# Patient Record
Sex: MALE | Employment: OTHER
[De-identification: names, ages, dates, MRNs, and addresses within clinical notes are randomized per-mention and may not be internally consistent; named-entity substitution may affect disease eponyms.]

---

## 2020-09-01 ENCOUNTER — OFFICE VISIT (OUTPATIENT)
Age: 65
End: 2020-09-01

## 2021-04-01 ENCOUNTER — OFFICE VISIT (OUTPATIENT)
Age: 66
End: 2021-04-01

## 2021-04-29 ENCOUNTER — OFFICE VISIT (OUTPATIENT)
Dept: URBAN - METROPOLITAN AREA CLINIC 9 | Facility: CLINIC | Age: 66
End: 2021-04-29

## 2021-05-10 ENCOUNTER — OFFICE VISIT (OUTPATIENT)
Dept: URBAN - METROPOLITAN AREA SURGERY CENTER 9 | Facility: SURGERY CENTER | Age: 66
End: 2021-05-10

## 2021-05-24 ENCOUNTER — OFFICE VISIT (OUTPATIENT)
Dept: URBAN - METROPOLITAN AREA SURGERY CENTER 9 | Facility: SURGERY CENTER | Age: 66
End: 2021-05-24

## 2021-06-09 ENCOUNTER — TELEPHONE ENCOUNTER (OUTPATIENT)
Dept: URBAN - METROPOLITAN AREA CLINIC 9 | Facility: CLINIC | Age: 66
End: 2021-06-09

## 2021-06-09 ENCOUNTER — OFFICE VISIT (OUTPATIENT)
Dept: URBAN - METROPOLITAN AREA CLINIC 9 | Facility: CLINIC | Age: 66
End: 2021-06-09

## 2021-11-01 ENCOUNTER — OFFICE VISIT (OUTPATIENT)
Age: 66
End: 2021-11-01

## 2021-11-03 ENCOUNTER — TELEPHONE ENCOUNTER (OUTPATIENT)
Dept: URBAN - METROPOLITAN AREA CLINIC 9 | Facility: CLINIC | Age: 66
End: 2021-11-03

## 2021-12-08 ENCOUNTER — OFFICE VISIT (OUTPATIENT)
Dept: URBAN - METROPOLITAN AREA CLINIC 9 | Facility: CLINIC | Age: 66
End: 2021-12-08

## 2022-07-30 ENCOUNTER — TELEPHONE ENCOUNTER (OUTPATIENT)
Age: 67
End: 2022-07-30

## 2022-07-31 ENCOUNTER — TELEPHONE ENCOUNTER (OUTPATIENT)
Age: 67
End: 2022-07-31

## 2022-07-31 RX ORDER — FAMOTIDINE 20 MG/1
1 TABLET ORAL
Qty: 0 | Refills: 16 | Status: ACTIVE | COMMUNITY
Start: 2021-04-29

## 2022-07-31 RX ORDER — FAMOTIDINE 20 MG/1
1 TABLET ORAL
Qty: 0 | Refills: 16 | Status: ACTIVE | COMMUNITY
Start: 2021-06-09

## 2022-07-31 RX ORDER — FAMOTIDINE 20 MG/1
1 TABLET ORAL
Qty: 0 | Refills: 16 | Status: ACTIVE | COMMUNITY
Start: 2021-12-08

## 2022-12-27 ENCOUNTER — DASHBOARD ENCOUNTERS (OUTPATIENT)
Age: 67
End: 2022-12-27

## 2022-12-27 ENCOUNTER — WEB ENCOUNTER (OUTPATIENT)
Dept: URBAN - METROPOLITAN AREA CLINIC 9 | Facility: CLINIC | Age: 67
End: 2022-12-27

## 2022-12-27 ENCOUNTER — OFFICE VISIT (OUTPATIENT)
Dept: URBAN - METROPOLITAN AREA CLINIC 9 | Facility: CLINIC | Age: 67
End: 2022-12-27
Payer: MEDICARE

## 2022-12-27 VITALS
WEIGHT: 178 LBS | BODY MASS INDEX: 25.48 KG/M2 | SYSTOLIC BLOOD PRESSURE: 138 MMHG | DIASTOLIC BLOOD PRESSURE: 80 MMHG | HEIGHT: 70 IN

## 2022-12-27 DIAGNOSIS — K82.4 GALLBLADDER POLYP: ICD-10-CM

## 2022-12-27 DIAGNOSIS — Z83.71 SCREENING COLONOSCOPY (Z12.11) FAMILY HISTORY OF COLONIC POLYPS: ICD-10-CM

## 2022-12-27 DIAGNOSIS — K21.9 GASTROESOPHAGEAL REFLUX DISEASE, UNSPECIFIED WHETHER ESOPHAGITIS PRESENT: ICD-10-CM

## 2022-12-27 PROCEDURE — 99213 OFFICE O/P EST LOW 20 MIN: CPT | Performed by: INTERNAL MEDICINE

## 2022-12-27 RX ORDER — FAMOTIDINE 20 MG/1
1 TABLET ORAL
OUTPATIENT
Start: 2021-12-08

## 2022-12-27 RX ORDER — FAMOTIDINE 20 MG/1
1 TABLET ORAL
Qty: 0 | Refills: 16 | Status: ACTIVE | COMMUNITY
Start: 2021-12-08

## 2022-12-27 NOTE — HPI-TODAY'S VISIT:
Pt here for f/u of dyspepsia. . No medications no nsaids No OTCs or herbals . Hx/o GERD with atypical chest pain presumed shoulder related . 2021 colon without polyps, repeat 5 years 2021 EGD with HP neg gastritis, duodenal bx negative . 2021 normal lipase, CMp, CBC 6/2021 US with 4 mm GB polyp 11/2021 US with no GB polyp, repeat 1 year . Pt here for f/u and doing well. he needs a f/u US for GB polyp and if no polyp is seen will stop Gb surveillance. Will cont famotidine once daily which has controlled his gerd. He will rtc 1 year, sooner if any symptoms. .

## 2024-08-30 ENCOUNTER — OFFICE VISIT (OUTPATIENT)
Dept: URBAN - METROPOLITAN AREA CLINIC 9 | Facility: CLINIC | Age: 69
End: 2024-08-30
Payer: MEDICARE

## 2024-08-30 VITALS
BODY MASS INDEX: 24.62 KG/M2 | DIASTOLIC BLOOD PRESSURE: 96 MMHG | HEIGHT: 70 IN | SYSTOLIC BLOOD PRESSURE: 162 MMHG | WEIGHT: 172 LBS

## 2024-08-30 DIAGNOSIS — R10.13 DYSPEPSIA: ICD-10-CM

## 2024-08-30 DIAGNOSIS — R17 ELEVATED BILIRUBIN: ICD-10-CM

## 2024-08-30 DIAGNOSIS — R11.0 NAUSEA: ICD-10-CM

## 2024-08-30 DIAGNOSIS — K82.4 GALLBLADDER POLYP: ICD-10-CM

## 2024-08-30 PROCEDURE — 99214 OFFICE O/P EST MOD 30 MIN: CPT | Performed by: INTERNAL MEDICINE

## 2024-08-30 RX ORDER — FAMOTIDINE 20 MG/1
1 TABLET ORAL
Status: ACTIVE | COMMUNITY
Start: 2021-12-08

## 2024-08-30 RX ORDER — FAMOTIDINE 20 MG/1
1 TABLET ORAL
OUTPATIENT
Start: 2021-12-08

## 2024-08-30 NOTE — HPI-TODAY'S VISIT:
Pt here for f/u of dyspepsia. . No medications no nsaids No OTCs or herbals . Hx/o GERD with atypical chest pain presumed shoulder related . 2021 colon without polyps, repeat 5 years 2021 EGD with HP neg gastritis, duodenal bx negative . 2021 normal lipase, CMp, CBC 6/2021 US with 4 mm GB polyp 11/2021 US with no GB polyp, repeat 1 year 12/2023 US with stable GB polyps 7/2024 CT a/p negative . 2/2024 CBC, BMP, lfts reviewed. TB elevated to 2.2. . Pt here for f/u. He is doing well. He still has GB polyps and needs a 1 year f/u US in 12/2024. He is on pepcid bid  and still having some persistent mild nausea. He has elevated lfts in 2/2024 and etiology is unclear so will repeat lfts, fractionate TB and if still elevated plan MRCP due to ongoing nausea. Also needs EGD with hp biopsies and celiac bx .

## 2024-09-09 ENCOUNTER — CLAIMS CREATED FROM THE CLAIM WINDOW (OUTPATIENT)
Dept: URBAN - METROPOLITAN AREA SURGERY CENTER 9 | Facility: SURGERY CENTER | Age: 69
End: 2024-09-09
Payer: MEDICARE

## 2024-09-09 ENCOUNTER — CLAIMS CREATED FROM THE CLAIM WINDOW (OUTPATIENT)
Dept: URBAN - METROPOLITAN AREA CLINIC 4 | Facility: CLINIC | Age: 69
End: 2024-09-09
Payer: MEDICARE

## 2024-09-09 DIAGNOSIS — K31.7 POLYP OF STOMACH AND DUODENUM: ICD-10-CM

## 2024-09-09 DIAGNOSIS — K29.70 GASTRITIS WITHOUT BLEEDING, UNSPECIFIED CHRONICITY, UNSPECIFIED GASTRITIS TYPE: ICD-10-CM

## 2024-09-09 DIAGNOSIS — K31.89 OTHER DISEASES OF STOMACH AND DUODENUM: ICD-10-CM

## 2024-09-09 PROCEDURE — 88305 TISSUE EXAM BY PATHOLOGIST: CPT | Performed by: PATHOLOGY

## 2024-09-09 PROCEDURE — 43239 EGD BIOPSY SINGLE/MULTIPLE: CPT | Performed by: INTERNAL MEDICINE

## 2024-09-09 PROCEDURE — 43239 EGD BIOPSY SINGLE/MULTIPLE: CPT | Performed by: CLINIC/CENTER

## 2024-09-09 PROCEDURE — 00731 ANES UPR GI NDSC PX NOS: CPT | Performed by: NURSE ANESTHETIST, CERTIFIED REGISTERED

## 2024-09-09 RX ORDER — FAMOTIDINE 20 MG/1
1 TABLET ORAL
Status: ACTIVE | COMMUNITY
Start: 2021-12-08

## 2024-11-06 ENCOUNTER — TELEPHONE ENCOUNTER (OUTPATIENT)
Dept: URBAN - METROPOLITAN AREA CLINIC 9 | Facility: CLINIC | Age: 69
End: 2024-11-06

## 2024-11-19 ENCOUNTER — OFFICE VISIT (OUTPATIENT)
Dept: URBAN - METROPOLITAN AREA CLINIC 9 | Facility: CLINIC | Age: 69
End: 2024-11-19
Payer: MEDICARE

## 2024-11-19 VITALS
BODY MASS INDEX: 25.2 KG/M2 | WEIGHT: 176 LBS | SYSTOLIC BLOOD PRESSURE: 168 MMHG | HEIGHT: 70 IN | DIASTOLIC BLOOD PRESSURE: 98 MMHG

## 2024-11-19 DIAGNOSIS — R10.13 DYSPEPSIA: ICD-10-CM

## 2024-11-19 DIAGNOSIS — K82.4 GALLBLADDER POLYP: ICD-10-CM

## 2024-11-19 PROCEDURE — 99213 OFFICE O/P EST LOW 20 MIN: CPT | Performed by: INTERNAL MEDICINE

## 2024-11-19 RX ORDER — FAMOTIDINE 20 MG/1
1 TABLET ORAL
Status: ACTIVE | COMMUNITY
Start: 2021-12-08

## 2024-11-19 RX ORDER — FAMOTIDINE 20 MG/1
1 TABLET ORAL
OUTPATIENT
Start: 2021-12-08

## 2024-11-19 NOTE — HPI-TODAY'S VISIT:
Pt here for f/u of dyspepsia. . No medications no nsaids No OTCs or herbals . Hx/o GERD with atypical chest pain presumed shoulder related . 2021 colon without polyps, repeat 5 years 2021 EGD with HP neg gastritis, duodenal bx negative 9/2024 EGD with bx neg for HP and celiac . 2021 normal lipase, CMp, CBC 6/2021 US with 4 mm GB polyp 11/2021 US with no GB polyp, repeat 1 year 12/2023 US with stable GB polyps 7/2024 CT a/p negative . 2/2024 CBC, BMP, lfts reviewed. TB elevated to 2.2. 2024 repeat lfts with fractionation with TB of 1.3 and indirect of 1.3 consistent with gilbert's . he has a f/u US in 12/2024. Overall he is better on pepcid bid and is doing well without any worsening symptoms. I will hold on further dyspepsia sx, await US from next month and rtc in 1 year. .

## 2024-12-02 ENCOUNTER — LAB OUTSIDE AN ENCOUNTER (OUTPATIENT)
Dept: URBAN - METROPOLITAN AREA CLINIC 9 | Facility: CLINIC | Age: 69
End: 2024-12-02

## 2024-12-09 ENCOUNTER — LAB OUTSIDE AN ENCOUNTER (OUTPATIENT)
Dept: URBAN - METROPOLITAN AREA CLINIC 9 | Facility: CLINIC | Age: 69
End: 2024-12-09

## 2025-05-15 ENCOUNTER — OFFICE VISIT (OUTPATIENT)
Dept: URBAN - METROPOLITAN AREA CLINIC 9 | Facility: CLINIC | Age: 70
End: 2025-05-15
Payer: MEDICARE

## 2025-05-15 DIAGNOSIS — R14.0 ABDOMINAL BLOATING: ICD-10-CM

## 2025-05-15 DIAGNOSIS — R13.19 CERVICAL DYSPHAGIA: ICD-10-CM

## 2025-05-15 PROBLEM — 40739000: Status: ACTIVE | Noted: 2025-05-15

## 2025-05-15 PROCEDURE — 99213 OFFICE O/P EST LOW 20 MIN: CPT | Performed by: PHYSICIAN ASSISTANT

## 2025-05-15 RX ORDER — FAMOTIDINE 20 MG/1
1 TABLET ORAL
Status: ACTIVE | COMMUNITY
Start: 2021-12-08

## 2025-05-15 RX ORDER — LISINOPRIL 10 MG/1
1 TABLET TABLET ORAL ONCE A DAY
Status: ACTIVE | COMMUNITY

## 2025-05-15 RX ORDER — TAMSULOSIN HYDROCHLORIDE 0.4 MG/1
1 CAPSULE CAPSULE ORAL ONCE A DAY
Status: ACTIVE | COMMUNITY

## 2025-05-15 NOTE — HPI-TODAY'S VISIT:
Pt here for f/u of dyspepsia. . No medications no nsaids No OTCs or herbals . Hx/o GERD with atypical chest pain presumed shoulder related . 2021 colon without polyps, repeat 5 years 2021 EGD with HP neg gastritis, duodenal bx negative 9/2024 EGD with bx neg for HP and celiac . 2021 normal lipase, CMp, CBC 6/2021 US with 4 mm GB polyp 11/2021 US with no GB polyp, repeat 1 year 12/2023 US with stable GB polyps 7/2024 CT a/p negative . 2/2024 CBC, BMP, lfts reviewed. TB elevated to 2.2. 2024 repeat lfts with fractionation with TB of 1.3 and indirect of 1.3 consistent with gilbert's 2025 - labs H/H 12.0/34.  Zac 1.6.   CT a/p w/con Mod stool vol.  he has a f/u US in 12/2024. Overall he is better on pepcid bid and is doing well without any worsening symptoms. I will hold on further dyspepsia sx, await US from next month and rtc in 1 year. . Interim visit - 5/15/25 Pt recently in ER for some lower abd pain, nausea, and urinary retention.  Labs and CT unrevealing.   Pt here for c/o of bloating and gas, and feeling of something not going down the right pipe,  No HB, reguritation.  No chest pain. Takes famotidine 20mg a night.   Will obtain the modified ba and esophagram. Double famotidine. Was found to have a moderate stool burden on recent CT.  Add capful of Benefiber, lots of water, exercise.  If not better, can tx for SIBO.   RTC once results available.

## 2025-06-02 ENCOUNTER — TELEPHONE ENCOUNTER (OUTPATIENT)
Dept: URBAN - METROPOLITAN AREA CLINIC 9 | Facility: CLINIC | Age: 70
End: 2025-06-02

## 2025-06-06 ENCOUNTER — WEB ENCOUNTER (OUTPATIENT)
Dept: URBAN - METROPOLITAN AREA CLINIC 9 | Facility: CLINIC | Age: 70
End: 2025-06-06

## 2025-06-16 ENCOUNTER — OFFICE VISIT (OUTPATIENT)
Dept: URBAN - METROPOLITAN AREA CLINIC 9 | Facility: CLINIC | Age: 70
End: 2025-06-16
Payer: MEDICARE

## 2025-06-16 DIAGNOSIS — K21.9 ACID REFLUX: ICD-10-CM

## 2025-06-16 DIAGNOSIS — K59.09 ACUTE CONSTIPATION IN CHILDHOOD: ICD-10-CM

## 2025-06-16 DIAGNOSIS — R93.3 ABNORMAL BARIUM SWALLOW: ICD-10-CM

## 2025-06-16 PROBLEM — 14760008: Status: ACTIVE | Noted: 2025-06-16

## 2025-06-16 PROCEDURE — 99214 OFFICE O/P EST MOD 30 MIN: CPT | Performed by: PHYSICIAN ASSISTANT

## 2025-06-16 RX ORDER — FAMOTIDINE 20 MG/1
1 TABLET ORAL
Status: ACTIVE | COMMUNITY
Start: 2021-12-08

## 2025-06-16 RX ORDER — TAMSULOSIN HYDROCHLORIDE 0.4 MG/1
1 CAPSULE CAPSULE ORAL ONCE A DAY
Status: ACTIVE | COMMUNITY

## 2025-06-16 RX ORDER — LISINOPRIL 10 MG/1
1 TABLET TABLET ORAL ONCE A DAY
Status: ACTIVE | COMMUNITY

## 2025-06-16 NOTE — HPI-TODAY'S VISIT:
Pt here for f/u of dyspepsia. . No medications no nsaids No OTCs or herbals . Hx/o GERD with atypical chest pain presumed shoulder related . 2021 colon without polyps, repeat 5 years 2021 EGD with HP neg gastritis, duodenal bx negative 9/2024 EGD with bx neg for HP and celiac . 2021 normal lipase, CMp, CBC 6/2021 US with 4 mm GB polyp 11/2021 US with no GB polyp, repeat 1 year 12/2023 US with stable GB polyps 7/2024 CT a/p negative Swallow study 2025-significant lingual tremors during the oral motor examination portion of the eval, premature loss of thin liquids over the base of the tongue prior to initiation of swallow, 1 incident of penetration into the airway with the initial swallow of thin liquids but did not go to the level of the vocal cords and was ejected from the airway.  Neurology consult was ordered. Esophagram 2025-WNL . 2/2024 CBC, BMP, lfts reviewed. TB elevated to 2.2. 2024 repeat lfts with fractionation with TB of 1.3 and indirect of 1.3 consistent with gilbert's 2025 - labs H/H 12.0/34.  Zac 1.6.   CT a/p w/con Mod stool vol.  he has a f/u US in 12/2024. Overall he is better on pepcid bid and is doing well without any worsening symptoms. I will hold on further dyspepsia sx, await US from next month and rtc in 1 year. . Interim visit - 5/15/25 Pt recently in ER for some lower abd pain, nausea, and urinary retention.  Labs and CT unrevealing.   Pt here for c/o of bloating and gas, and feeling of something not going down the right pipe,  No HB, reguritation.  No chest pain. Takes famotidine 20mg a night.  Will obtain the modified ba and esophagram. Double famotidine. Was found to have a moderate stool burden on recent CT.  Add capful of Benefiber, lots of water, exercise.  If not better, can tx for SIBO.   RTC once results available.  Interim visit 6/16/25 Pt here to f/u on recent barium esophagram and swallow study.  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  He still occ has that feeling that something is not going down right pipe.  Due to results of swallow study, he has appt with Neurology. Occ has some tremors in hands.  He is taking pepcid BID.  No GERD symptoms.   Last EGD 2024 - no alarming findings.  Just began eating more fiber in diet and bowel movements have been great and daily.   So, did not add the Benefiber.  RTC prn or after colonoscopy next year.

## (undated) LAB
ALBUMIN SERUM: (no result)
ALKALINE PHOSPHATASE: (no result)
ALT (SGPT) (ALANINE AMINO TRANSFERASE): (no result)
AST (SGOT)  (ASPART AMINO TRANSFERASE): (no result)
BILIRUBIN, TOTAL: (no result)
BLOOD COUNT, PLATELET, AUTOMATED: (no result)
BUN (BLOOD UREA NITROGEN): (no result)
CALCIUM SERUM: (no result)
CHLORIDE BLOOD: (no result)
CREATININE BLOOD: (no result)
GLUCOSE: (no result)
GRANULOCYTE %: (no result)
HCT (HEMATOCRIT): (no result)
HGB (HEMOGLOBIN): (no result)
LIPASE: (no result)
LYMPHOCYTES, TOTAL: (no result)
MCH (MEAN CORPUSCULAR HEMOGLOBIN): (no result)
MCHC (MEAN CORPUSCULAR HEMOGLOBIN CONCENTRATI: (no result)
MCV (MEAN CORPUSCULAR VOLUME): (no result)
MONOCYTES: (no result)
POTASSIUM SERUM: (no result)
PROTEIN, TOTAL, SERUM: (no result)
RBC: (no result)
SODIUM SERUM: (no result)
WBC: (no result)